# Patient Record
(demographics unavailable — no encounter records)

---

## 2025-06-20 NOTE — HISTORY OF PRESENT ILLNESS
[Born at ___ Wks Gestation] : The patient was born at [unfilled] weeks gestation [] : via normal spontaneous vaginal delivery [[facility]] : at [facility] [(1) _____] : [unfilled] [(5) _____] : [unfilled] [BW: _____] : weight of [unfilled] [Length: _____] : length of [unfilled] [DW: _____] : Discharge weight was [unfilled] [Age: ___] : [unfilled] year old mother [G: ___] : G [unfilled] [P: ___] : P [unfilled] [HepBsAG] : HepBsAg negative [HIV] : HIV negative [HepC] : Hepatitis C negative [GBS] : GBS negative [Rubella (Immune)] : Rubella immune [VDRL/RPR (Reactive)] : VDRL/RPR nonreactive [None] : There are no risk factors [] : negative [Yes] : Yes [FreeTextEntry9] : A pos [Breast milk] : breast milk [Normal] : Normal [In Bassinet/Crib] : sleeps in bassinet/crib [On back] : sleeps on back [Co-sleeping] : no co-sleeping [Loose bedding, pillow, toys, and/or bumpers in crib] : no loose bedding, pillow, toys, and/or bumpers in crib [de-identified] : spitting up sometimes

## 2025-06-20 NOTE — PHYSICAL EXAM

## 2025-06-20 NOTE — DISCUSSION/SUMMARY
[FreeTextEntry1] : 3 do FT , weight loss transcut bili 13.3, borderline, advised re-check 2-3 days, to ER if worsening weight check advised to bring in paperwork from hospital Recommend exclusive breastfeeding, 8-12 feedings per day. Please wake infant every 2-3 hours for feeds if necessary.  Mother should continue prenatal vitamins and avoid alcohol. If formula is needed, recommend iron-fortified formulations every 3-4 hrs. When in car, patient should be in rear-facing car seat in back seat. Air dry umbillical stump. Put baby to sleep on back, in own crib with no loose or soft bedding. Limit baby's exposure to others, especially those with fever or unknown vaccine status. Advised vitamin D or tri-vi-sol PO daily if nursing.

## 2025-07-01 NOTE — DISCUSSION/SUMMARY
[FreeTextEntry1] : 2 week  gaining weight well, nursing  discussed feeding transcut bili 7.4, decreasing check at 1 month unless issues arise

## 2025-07-21 NOTE — DEVELOPMENTAL MILESTONES
[Passed] : passed [FreeTextEntry2] : 3 [Normal Development] : Normal Development [None] : none [Calms when picked up or spoken to] : calms when picked up or spoken to [Alerts to unexpected sound] : alerts to unexpected sound [Holds chin up in prone] : holds chin up in prone [Holds fingers more open at rest] : holds fingers more open at rest

## 2025-07-21 NOTE — DISCUSSION/SUMMARY
[] : The components of the vaccine(s) to be administered today are listed in the plan of care. The disease(s) for which the vaccine(s) are intended to prevent and the risks have been discussed with the caretaker.  The risks are also included in the appropriate vaccination information statements which have been provided to the patient's caregiver.  The caregiver has given consent to vaccinate. [FreeTextEntry1] : 1 mth well, nml growth and development  Hep B #2 Recommend exclusive breastfeeding, 8-12 feedings per day. Mother should continue prenatal vitamins and avoid alcohol. If formula is needed, recommend iron-fortified formulations, 2-4 oz every 3-4 hrs. When in car, patient should be in rear-facing car seat in back seat. Put baby to sleep on back, in own crib with no loose or soft bedding. Help baby to develop sleep and feeding routines. May offer pacifier if needed. Start tummy time when awake. Limit baby's exposure to others, especially those with fever or unknown vaccine status. Parents counseled to call if rectal temperature >100.4 degrees F. Start multivitamins with iron 1 ml daily.

## 2025-07-21 NOTE — PHYSICAL EXAM
